# Patient Record
Sex: FEMALE | Race: WHITE | NOT HISPANIC OR LATINO
[De-identification: names, ages, dates, MRNs, and addresses within clinical notes are randomized per-mention and may not be internally consistent; named-entity substitution may affect disease eponyms.]

---

## 2021-12-19 ENCOUNTER — TRANSCRIPTION ENCOUNTER (OUTPATIENT)
Age: 61
End: 2021-12-19

## 2023-01-30 ENCOUNTER — APPOINTMENT (OUTPATIENT)
Dept: ORTHOPEDIC SURGERY | Facility: CLINIC | Age: 63
End: 2023-01-30
Payer: COMMERCIAL

## 2023-01-30 VITALS — WEIGHT: 195 LBS | BODY MASS INDEX: 35.88 KG/M2 | HEIGHT: 62 IN

## 2023-01-30 DIAGNOSIS — J45.909 UNSPECIFIED ASTHMA, UNCOMPLICATED: ICD-10-CM

## 2023-01-30 DIAGNOSIS — E11.9 TYPE 2 DIABETES MELLITUS W/OUT COMPLICATIONS: ICD-10-CM

## 2023-01-30 DIAGNOSIS — I10 ESSENTIAL (PRIMARY) HYPERTENSION: ICD-10-CM

## 2023-01-30 PROBLEM — Z00.00 ENCOUNTER FOR PREVENTIVE HEALTH EXAMINATION: Status: ACTIVE | Noted: 2023-01-30

## 2023-01-30 PROCEDURE — 99204 OFFICE O/P NEW MOD 45 MIN: CPT

## 2023-01-30 RX ORDER — METHOCARBAMOL 750 MG/1
750 TABLET, FILM COATED ORAL 3 TIMES DAILY
Qty: 90 | Refills: 0 | Status: ACTIVE | COMMUNITY
Start: 2023-01-30 | End: 1900-01-01

## 2023-01-30 RX ORDER — METHYLPREDNISOLONE 4 MG/1
4 TABLET ORAL
Qty: 1 | Refills: 0 | Status: ACTIVE | COMMUNITY
Start: 2023-01-30 | End: 1900-01-01

## 2023-01-30 NOTE — HISTORY OF PRESENT ILLNESS
[Neck] : neck [Mid-back] : mid-back [Lower back] : lower back [9] : 9 [Radiating] : radiating [Shooting] : shooting [Constant] : constant [Household chores] : household chores [Work] : work [Sleep] : sleep [Nothing helps with pain getting better] : Nothing helps with pain getting better [Sitting] : sitting [Standing] : standing [Walking] : walking [Lying in bed] : lying in bed [Full time] : Work status: full time [de-identified] : 01/30/2023 - 64 y/o F presenting for an evaluation of cervical and lumbar spine. Last seen several years ago (hx of l-spine surgery). Newer cervical symptoms. During the studies from 09/22, patients symptoms were not as severe and prevalent. In terms of recent treatments, she is taking Naprosyn on daily basis. No improvement despite home exercise rehab program.  She reports difficulty while sleeping. multiple falls throughout this year, follows up with concussion specialist. Also c/o instability. General medical health is good. She is Diabetic, controlled. \par \par Cervical - Radiating right arm pain. neck and arm pain, progressive over the last couple months and acute flare up this weekend. carpal tunnel b/l severe on the right side (numbness in fingers). weakness in right arm. \par Lumbar- Chronic hx of back issues. radiating b/l leg pain, worse on the right. standing up straight produces radiating back pain. 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

## 2023-01-30 NOTE — PHYSICAL EXAM
[Biceps 2+] : biceps 2+ [Triceps 2+] : triceps 2+ [Brachioradialis 2+] : brachioradialis 2+ [Right] : right shoulder [Normal Coordination] : normal coordination [Normal DTR UE/LE] : normal DTR UE/LE  [Normal Sensation] : normal sensation [Normal Mood and Affect] : normal mood and affect [Orientated] : orientated [Able to Communicate] : able to communicate [Normal Skin] : normal skin [No Rash] : no rash [No Ulcers] : no ulcers [No Lesions] : no lesions [No obvious lymphadenopathy in areas examined] : no obvious lymphadenopathy in areas examined [Well Developed] : well developed [Well Nourished] : well nourished [Peripheral vascular exam is grossly normal] : peripheral vascular exam is grossly normal [No Respiratory Distress] : no respiratory distress [3___] : right triceps 3[unfilled]/5 [Flexion] : flexion [Extension] : extension [de-identified] : Constitutional:\par - General Appearance:\par Unremarkable\par Body Habitus\par Well Developed\par Well Nourished\par Body Habitus\par No Deformities\par Well Groomed\par Ability To communicate:\par Normal\par Neurologic:\par Global sensation is intact to upper and lower extremities. See examination of Neck and/or Spine\par for exceptions.\par Orientation to Time, Place and Person is: Normal\par Mood And Affect is Normal\par Skin:\par - Head/Face, Right Upper/Lower Extremity, Left Upper/Lower Extremity: Normal\par See Examination of Neck and/or Spine for exceptions\par Cardiovascular:\par Peripheral Cardiovascular System is Normal\par Palpation of Lymph Nodes:\par Normal Palpation of lymph nodes in: Axilla, Cervical, Inguinal\par Abnormal Palpation of lymph nodes in: None  [] : negative facet loading [FreeTextEntry9] : rom of right shoulder produces pain in the right arm

## 2023-01-30 NOTE — DISCUSSION/SUMMARY
[de-identified] : In regard to the cervical spine:\par I am requesting an updated cervical spine MRI for chronic cervical radiculopathy, etiology of new right upper extremity weakness( right bicep 3/5, tricep 3/5), r/o HNP. \par I am prescribing the patient MDP for pain relief. Titration schedule provided. Counseled patient to be cognisant of blood sugar levels while completing oral steroid taper.\par She will follow up after the study is complete. \par \par In regard to the lumbar spine:\par MRI shows spondylolisthesis L4/5 with severe stenosis, moderate stenosis L2/3 L3/4. At this time treatment plan will be more focused in terms of cervical spine as this is the majority of patients symptom complex. \par \par Prior to appointment and during encounter with patient extensive medical records were reviewed including but not limited to, hospital records, outpatient records, imaging results, and lab data.During this appointment the patient was examined, diagnoses were discussed and explained in a face to face manner. In addition extensive time was spent reviewing aforementioned diagnostic studies. Counseling including abnormal image results, differential diagnoses, treatment options, risk and benefits, lifestyle changes, current condition, and current medications was performed. Patient's comments, questions, and concerns were addressed and patient verbalized understanding. Based on this patient's presentation at our office, which is an orthopedic spine surgeon's office, this patient inherently / intrinsically has a risk, however minute, of developing issues such as Cauda equina syndrome, bowel and bladder changes, or progression of motor or neurological deficits such as paralysis which may be permanent.  \par \par KENYA TITUS Acting as a Scribe for Dr. Hanna\par I, Kenya Titus, attest that this documentation has been prepared under the direction and in the presence of Provider Ramses Hanna MD.

## 2023-01-30 NOTE — DATA REVIEWED
[Cervical Spine] : cervical spine [MRI] : MRI [Lumbar Spine] : lumbar spine [Report was reviewed and noted in the chart] : The report was reviewed and noted in the chart [I independently reviewed and interpreted images and report] : I independently reviewed and interpreted images and report [I reviewed the films/CD and additionally noted] : I reviewed the films/CD and additionally noted [FreeTextEntry2] : 09/2022 - spondylolisthesis L4/5 with severe stenosis, moderate stenosis L2/3 L3/4 [FreeTextEntry1] : I stop paperwork reviewed

## 2023-02-02 ENCOUNTER — RESULT REVIEW (OUTPATIENT)
Age: 63
End: 2023-02-02

## 2023-02-15 ENCOUNTER — APPOINTMENT (OUTPATIENT)
Dept: ORTHOPEDIC SURGERY | Facility: CLINIC | Age: 63
End: 2023-02-15
Payer: COMMERCIAL

## 2023-02-15 VITALS — BODY MASS INDEX: 35.88 KG/M2 | WEIGHT: 195 LBS | HEIGHT: 62 IN

## 2023-02-15 PROCEDURE — 99214 OFFICE O/P EST MOD 30 MIN: CPT

## 2023-02-15 RX ORDER — GABAPENTIN 300 MG/1
300 CAPSULE ORAL 3 TIMES DAILY
Qty: 90 | Refills: 1 | Status: ACTIVE | COMMUNITY
Start: 2023-02-15 | End: 1900-01-01

## 2023-02-26 NOTE — HISTORY OF PRESENT ILLNESS
[Neck] : neck [Mid-back] : mid-back [Lower back] : lower back [9] : 9 [Radiating] : radiating [Shooting] : shooting [Constant] : constant [Household chores] : household chores [Work] : work [Sleep] : sleep [Nothing helps with pain getting better] : Nothing helps with pain getting better [Sitting] : sitting [Standing] : standing [Walking] : walking [Lying in bed] : lying in bed [Full time] : Work status: full time [de-identified] : 02/15/2023 - 62 y/o F presenting for a test follow up of C spine. No significant change in symptoms since her last visit. \par Cervical - Radiating right arm pain. neck and arm pain. She states her arm pain is constant. Mildly improved symptoms from MDP, but not significant. She is taking Naproxen daily, which provides some relief. Difficulty completing tasks due to pain in the right arm, not weakness. Side effects from Lyrica, but treatment with gabapentin in the past was helpful. \par \par 01/30/2023 - 62 y/o F presenting for an evaluation of cervical and lumbar spine. Last seen several years ago (hx of l-spine surgery). Newer cervical symptoms. During the studies from 09/22, patients symptoms were not as severe and prevalent. In terms of recent treatments, she is taking Naprosyn on daily basis. No improvement despite home exercise rehab program.  She reports difficulty while sleeping. multiple falls throughout this year, follows up with concussion specialist. Also c/o instability. General medical health is good. She is Diabetic, controlled. \par \par Cervical - Radiating right arm pain. neck and arm pain, progressive over the last couple months and acute flare up this weekend. carpal tunnel b/l severe on the right side (numbness in fingers). weakness in right arm. \par Lumbar- Chronic hx of back issues. radiating b/l leg pain, worse on the right. standing up straight produces radiating back pain. 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

## 2023-02-26 NOTE — PHYSICAL EXAM
[Normal Coordination] : normal coordination [Normal DTR UE/LE] : normal DTR UE/LE  [Normal Sensation] : normal sensation [Normal Mood and Affect] : normal mood and affect [Orientated] : orientated [Able to Communicate] : able to communicate [Normal Skin] : normal skin [No Rash] : no rash [No Ulcers] : no ulcers [No Lesions] : no lesions [No obvious lymphadenopathy in areas examined] : no obvious lymphadenopathy in areas examined [Well Developed] : well developed [Well Nourished] : well nourished [Peripheral vascular exam is grossly normal] : peripheral vascular exam is grossly normal [No Respiratory Distress] : no respiratory distress [Extension] : extension [3___] : right triceps 3[unfilled]/5 [Biceps 2+] : biceps 2+ [Triceps 2+] : triceps 2+ [Brachioradialis 2+] : brachioradialis 2+ [de-identified] : Constitutional:\par - General Appearance:\par Unremarkable\par Body Habitus\par Well Developed\par Well Nourished\par Body Habitus\par No Deformities\par Well Groomed\par Ability To communicate:\par Normal\par Neurologic:\par Global sensation is intact to upper and lower extremities. See examination of Neck and/or Spine\par for exceptions.\par Orientation to Time, Place and Person is: Normal\par Mood And Affect is Normal\par Skin:\par - Head/Face, Right Upper/Lower Extremity, Left Upper/Lower Extremity: Normal\par See Examination of Neck and/or Spine for exceptions\par Cardiovascular:\par Peripheral Cardiovascular System is Normal\par Palpation of Lymph Nodes:\par Normal Palpation of lymph nodes in: Axilla, Cervical, Inguinal\par Abnormal Palpation of lymph nodes in: None  [] : negative facet loading

## 2023-02-26 NOTE — DISCUSSION/SUMMARY
[de-identified] : 62 y/o F with mild disc herniations c4-5 c5-6 on the right side, no significant nerve impingement. Not enough nerve compression to be considered a surgical candidate.  Patient will follow up with outside PM to discuss C7-T1 YESIKA for pain relief. Patient was advised to work on stretching, strengthening and range of motion. Continuation of cervical home exercise program. Discussed continued medical mgmt with Naprosyn. She was provided with a prescription for gabapentin. \par \par Prior to appointment and during encounter with patient extensive medical records were reviewed including but not limited to, hospital records, outpatient records, imaging results, and lab data.During this appointment the patient was examined, diagnoses were discussed and explained in a face to face manner. In addition extensive time was spent reviewing aforementioned diagnostic studies. Counseling including abnormal image results, differential diagnoses, treatment options, risk and benefits, lifestyle changes, current condition, and current medications was performed. Patient's comments, questions, and concerns were addressed and patient verbalized understanding. Based on this patient's presentation at our office, which is an orthopedic spine surgeon's office, this patient inherently / intrinsically has a risk, however minute, of developing issues such as Cauda equina syndrome, bowel and bladder changes, or progression of motor or neurological deficits such as paralysis which may be permanent.  \par \par KENYA DUMONT Acting as a Scribe for Kenya Alcazar, attest that this documentation has been prepared under the direction and in the presence of Provider Ramses Hanna MD.

## 2023-02-26 NOTE — DATA REVIEWED
[MRI] : MRI [Cervical Spine] : cervical spine [Report was reviewed and noted in the chart] : The report was reviewed and noted in the chart [I independently reviewed and interpreted images and report] : I independently reviewed and interpreted images and report [FreeTextEntry1] : I stop paperwork reviewed

## 2023-03-11 ENCOUNTER — RESULT REVIEW (OUTPATIENT)
Age: 63
End: 2023-03-11

## 2023-03-22 ENCOUNTER — APPOINTMENT (OUTPATIENT)
Dept: ORTHOPEDIC SURGERY | Facility: CLINIC | Age: 63
End: 2023-03-22
Payer: COMMERCIAL

## 2023-03-22 VITALS — BODY MASS INDEX: 35.88 KG/M2 | HEIGHT: 62 IN | WEIGHT: 195 LBS

## 2023-03-22 PROCEDURE — 99214 OFFICE O/P EST MOD 30 MIN: CPT

## 2023-03-22 RX ORDER — METHOCARBAMOL 750 MG/1
750 TABLET, FILM COATED ORAL 3 TIMES DAILY
Qty: 90 | Refills: 1 | Status: ACTIVE | COMMUNITY
Start: 2023-03-22 | End: 1900-01-01

## 2023-03-27 NOTE — DISCUSSION/SUMMARY
[de-identified] : MRI reviewed with the patient from 06/2020 demonstrates severe stenosis L4/5. Moderate stenosis L2/3 L3/4\par I am requesting an updated L spine MRI to evaluate for stenosis, last study is approx 3 years ago, patient has progressive symptoms and 2 falls, refrac to nsaids, muscle relaxer, nerve medication, PT. I am referring the patient to pain management to discuss trying Lumbar Epidural Spine Injections for pain relief with Dr. Tsang. Possible surgical candidate for decompression based upon MRI review and if symptoms continue to be functionally incapacitating. She will continue current PT trial. Patient was provided with a prescription for methocarbamol. Follow up after MRI for review. \par \par Prior to appointment and during encounter with patient extensive medical records were reviewed including but not limited to, hospital records, outpatient records, imaging results, and lab data.During this appointment the patient was examined, diagnoses were discussed and explained in a face to face manner. In addition extensive time was spent reviewing aforementioned diagnostic studies. Counseling including abnormal image results, differential diagnoses, treatment options, risk and benefits, lifestyle changes, current condition, and current medications was performed. Patient's comments, questions, and concerns were addressed and patient verbalized understanding. Based on this patient's presentation at our office, which is an orthopedic spine surgeon's office, this patient inherently / intrinsically has a risk, however minute, of developing issues such as Cauda equina syndrome, bowel and bladder changes, or progression of motor or neurological deficits such as paralysis which may be permanent.\par \par KENYA DUMONT Acting as a Scribe for Dr. Hanna\par I, Kenya Dumont, attest that this documentation has been prepared under the direction and in the presence of Provider Ramses Hanna MD.

## 2023-03-27 NOTE — PHYSICAL EXAM
[Normal Coordination] : normal coordination [Normal DTR UE/LE] : normal DTR UE/LE  [Normal Sensation] : normal sensation [Normal Mood and Affect] : normal mood and affect [Orientated] : orientated [Able to Communicate] : able to communicate [Normal Skin] : normal skin [No Rash] : no rash [No Ulcers] : no ulcers [No Lesions] : no lesions [No obvious lymphadenopathy in areas examined] : no obvious lymphadenopathy in areas examined [Well Developed] : well developed [Well Nourished] : well nourished [Peripheral vascular exam is grossly normal] : peripheral vascular exam is grossly normal [No Respiratory Distress] : no respiratory distress [] : light touch intact throughout both lower extremities [de-identified] : Constitutional:\par - General Appearance:\par Unremarkable\par Body Habitus\par Well Developed\par Well Nourished\par Body Habitus\par No Deformities\par Well Groomed\par Ability To communicate:\par Normal\par Neurologic:\par Global sensation is intact to upper and lower extremities. See examination of Neck and/or Spine\par for exceptions.\par Orientation to Time, Place and Person is: Normal\par Mood And Affect is Normal\par Skin:\par - Head/Face, Right Upper/Lower Extremity, Left Upper/Lower Extremity: Normal\par See Examination of Neck and/or Spine for exceptions\par Cardiovascular:\par Peripheral Cardiovascular System is Normal\par Palpation of Lymph Nodes:\par Normal Palpation of lymph nodes in: Axilla, Cervical, Inguinal\par Abnormal Palpation of lymph nodes in: None

## 2023-03-27 NOTE — HISTORY OF PRESENT ILLNESS
[Neck] : neck [Mid-back] : mid-back [Lower back] : lower back [9] : 9 [Radiating] : radiating [Shooting] : shooting [Constant] : constant [Household chores] : household chores [Work] : work [Sleep] : sleep [Nothing helps with pain getting better] : Nothing helps with pain getting better [Sitting] : sitting [Standing] : standing [Walking] : walking [Lying in bed] : lying in bed [Full time] : Work status: full time [de-identified] : 3/22/23: The patient is a 63 year female who presents today follow up low back pain. Severity of left sided L spine pain and sciatica b/l worse since last visit. She reports 2 falls since last visit (imbalance due to back pain and sciatic). She is currently following up for post concussion symptoms. Strength in the LE remains intact. No paresthesia. She reports side effects from gabapentin, self discontinued. Occasionally using prescribed muscle relaxer at nighttime. Taking Naproxen for fibromyalgia, pain is breaking through. \par Cervical symptoms are tolerable at this time, pain in the arm controlled. \par Pain:    At Rest: 5/10 \par With Activity:  8/10 \par Improves with: nothing, muscle relaxer\par Worse with: standing, sitting\par Treatment/Imaging/Studies Since: zprad xray\par Change since last visit: fell on 2/28/23, and 3/10/23\par Additional Information: Seeing Dr Cotto for Concussion\par \par 02/15/2023 - 62 y/o F presenting for a test follow up of C spine. No significant change in symptoms since her last visit. \par Cervical - Radiating right arm pain. neck and arm pain. She states her arm pain is constant. Mildly improved symptoms from MDP, but not significant. She is taking Naproxen daily, which provides some relief. Difficulty completing tasks due to pain in the right arm, not weakness. Side effects from Lyrica, but treatment with gabapentin in the past was helpful. \par \par 01/30/2023 - 62 y/o F presenting for an evaluation of cervical and lumbar spine. Last seen several years ago (hx of l-spine surgery). Newer cervical symptoms. During the studies from 09/22, patients symptoms were not as severe and prevalent. In terms of recent treatments, she is taking Naprosyn on daily basis. No improvement despite home exercise rehab program.  She reports difficulty while sleeping. multiple falls throughout this year, follows up with concussion specialist. Also c/o instability. General medical health is good. She is Diabetic, controlled. \par \par Cervical - Radiating right arm pain. neck and arm pain, progressive over the last couple months and acute flare up this weekend. carpal tunnel b/l severe on the right side (numbness in fingers). weakness in right arm. \par Lumbar- Chronic hx of back issues. radiating b/l leg pain, worse on the right. standing up straight produces radiating back pain. 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

## 2023-04-27 ENCOUNTER — RESULT REVIEW (OUTPATIENT)
Age: 63
End: 2023-04-27

## 2023-05-03 ENCOUNTER — APPOINTMENT (OUTPATIENT)
Dept: ORTHOPEDIC SURGERY | Facility: CLINIC | Age: 63
End: 2023-05-03
Payer: COMMERCIAL

## 2023-05-03 PROCEDURE — 99214 OFFICE O/P EST MOD 30 MIN: CPT

## 2023-05-03 NOTE — HISTORY OF PRESENT ILLNESS
[Neck] : neck [Mid-back] : mid-back [Lower back] : lower back [9] : 9 [Radiating] : radiating [Shooting] : shooting [Constant] : constant [Household chores] : household chores [Work] : work [Sleep] : sleep [Nothing helps with pain getting better] : Nothing helps with pain getting better [Sitting] : sitting [Standing] : standing [Walking] : walking [Lying in bed] : lying in bed [Full time] : Work status: full time [de-identified] : 5/3/23: Patient presenting for follow up and MRI review of L Spine. Symptom wise, she complains of significant radicular pains in the b/l legs (R > L), although both are significant. Pain level is interfering with day to day living. difficulty driving, walking, standing, sitting. Ambulating with use of a cane. currently enrolled in PT, aquatic therapy - which seems to be helping but pain is continuing to break through. Scheduled for consult with PM tomorrow. Legs are subjectively weak. No foot drop. \par \par 3/22/23: The patient is a 63 year female who presents today follow up low back pain. Severity of left sided L spine pain and sciatica b/l worse since last visit. She reports 2 falls since last visit (imbalance due to back pain and sciatic). She is currently following up for post concussion symptoms. Strength in the LE remains intact. No paresthesia. She reports side effects from gabapentin, self discontinued. Occasionally using prescribed muscle relaxer at nighttime. Taking Naproxen for fibromyalgia, pain is breaking through. \par Cervical symptoms are tolerable at this time, pain in the arm controlled. \par Pain:    At Rest: 5/10 \par With Activity:  8/10 \par Improves with: nothing, muscle relaxer\par Worse with: standing, sitting\par Treatment/Imaging/Studies Since: zprad xray\par Change since last visit: fell on 2/28/23, and 3/10/23\par Additional Information: Seeing Dr Cotto for Concussion\par \par 02/15/2023 - 64 y/o F presenting for a test follow up of C spine. No significant change in symptoms since her last visit. \par Cervical - Radiating right arm pain. neck and arm pain. She states her arm pain is constant. Mildly improved symptoms from MDP, but not significant. She is taking Naproxen daily, which provides some relief. Difficulty completing tasks due to pain in the right arm, not weakness. Side effects from Lyrica, but treatment with gabapentin in the past was helpful. \par \par 01/30/2023 - 64 y/o F presenting for an evaluation of cervical and lumbar spine. Last seen several years ago (hx of l-spine surgery). Newer cervical symptoms. During the studies from 09/22, patients symptoms were not as severe and prevalent. In terms of recent treatments, she is taking Naprosyn on daily basis. No improvement despite home exercise rehab program.  She reports difficulty while sleeping. multiple falls throughout this year, follows up with concussion specialist. Also c/o instability. General medical health is good. She is Diabetic, controlled. \par \par Cervical - Radiating right arm pain. neck and arm pain, progressive over the last couple months and acute flare up this weekend. carpal tunnel b/l severe on the right side (numbness in fingers). weakness in right arm. \par Lumbar- Chronic hx of back issues. radiating b/l leg pain, worse on the right. standing up straight produces radiating back pain. 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

## 2023-05-03 NOTE — PHYSICAL EXAM
[Normal Coordination] : normal coordination [Normal DTR UE/LE] : normal DTR UE/LE  [Normal Sensation] : normal sensation [Normal Mood and Affect] : normal mood and affect [Orientated] : orientated [Able to Communicate] : able to communicate [Normal Skin] : normal skin [No Rash] : no rash [No Ulcers] : no ulcers [No Lesions] : no lesions [No obvious lymphadenopathy in areas examined] : no obvious lymphadenopathy in areas examined [Well Developed] : well developed [Well Nourished] : well nourished [Peripheral vascular exam is grossly normal] : peripheral vascular exam is grossly normal [No Respiratory Distress] : no respiratory distress [] : ambulation with cane [de-identified] : Constitutional:\par - General Appearance:\par Unremarkable\par Body Habitus\par Well Developed\par Well Nourished\par Body Habitus\par No Deformities\par Well Groomed\par Ability To communicate:\par Normal\par Neurologic:\par Global sensation is intact to upper and lower extremities. See examination of Neck and/or Spine\par for exceptions.\par Orientation to Time, Place and Person is: Normal\par Mood And Affect is Normal\par Skin:\par - Head/Face, Right Upper/Lower Extremity, Left Upper/Lower Extremity: Normal\par See Examination of Neck and/or Spine for exceptions\par Cardiovascular:\par Peripheral Cardiovascular System is Normal\par Palpation of Lymph Nodes:\par Normal Palpation of lymph nodes in: Axilla, Cervical, Inguinal\par Abnormal Palpation of lymph nodes in: None

## 2023-05-03 NOTE — DISCUSSION/SUMMARY
[de-identified] : MRI reviewed with the patient from 4/27/23 demonstrates severe stenosis L4/5. Moderate stenosis L2/3 L3/4. \par Ultimately, I discussed with the patient she is a surgical candidate for decompression if symptoms continue to be functionally incapacitating and refractory to non operative treatments. She will continue current PT trial and aquatic therapy. Patient will keep holding appointment with pain management (Dr. Tsang) scheduled for tomorrow and receive LESI. F/U in 4 wks, will asses response to PT and injection at next visit. \par \par Prior to appointment and during encounter with patient extensive medical records were reviewed including but not limited to, hospital records, outpatient records, imaging results, and lab data.During this appointment the patient was examined, diagnoses were discussed and explained in a face to face manner. In addition extensive time was spent reviewing aforementioned diagnostic studies. Counseling including abnormal image results, differential diagnoses, treatment options, risk and benefits, lifestyle changes, current condition, and current medications was performed. Patient's comments, questions, and concerns were addressed and patient verbalized understanding. Based on this patient's presentation at our office, which is an orthopedic spine surgeon's office, this patient inherently / intrinsically has a risk, however minute, of developing issues such as Cauda equina syndrome, bowel and bladder changes, or progression of motor or neurological deficits such as paralysis which may be permanent.\par \par KENYA TITUS Acting as a Scribe for Dr. Manzo I, Kenya Titus, attest that this documentation has been prepared under the direction and in the presence of Provider Ramses Hanna MD.

## 2023-05-31 ENCOUNTER — APPOINTMENT (OUTPATIENT)
Dept: ORTHOPEDIC SURGERY | Facility: CLINIC | Age: 63
End: 2023-05-31
Payer: COMMERCIAL

## 2023-05-31 VITALS — WEIGHT: 195 LBS | BODY MASS INDEX: 35.88 KG/M2 | HEIGHT: 62 IN

## 2023-05-31 PROCEDURE — 99214 OFFICE O/P EST MOD 30 MIN: CPT

## 2023-05-31 RX ORDER — METHOCARBAMOL 750 MG/1
750 TABLET, FILM COATED ORAL
Qty: 30 | Refills: 1 | Status: ACTIVE | COMMUNITY
Start: 2023-05-31 | End: 1900-01-01

## 2023-06-01 NOTE — HISTORY OF PRESENT ILLNESS
[Neck] : neck [Mid-back] : mid-back [Lower back] : lower back [9] : 9 [Radiating] : radiating [Shooting] : shooting [Constant] : constant [Household chores] : household chores [Work] : work [Sleep] : sleep [Nothing helps with pain getting better] : Nothing helps with pain getting better [Sitting] : sitting [Standing] : standing [Walking] : walking [Lying in bed] : lying in bed [Full time] : Work status: full time [de-identified] : 5/31/2023: Patient presenting for an L spine follow up. She received MBB with Dr. Tsang a few weeks ago - initially there was significant relief but the pain has returned intermittently. B/l LE strength is intact. Overall, b/l LE radic pains are controlled. RLE pain > LLE pain. She is not currently enrolled in PT, but reports she has benefited from it in the past. She is not on aspirin. \par \par 5/3/23: Patient presenting for follow up and MRI review of L Spine. Symptom wise, she complains of significant radicular pains in the b/l legs (R > L), although both are significant. Pain level is interfering with day to day living. difficulty driving, walking, standing, sitting. Ambulating with use of a cane. currently enrolled in PT, aquatic therapy - which seems to be helping but pain is continuing to break through. Scheduled for consult with PM tomorrow. Legs are subjectively weak. No foot drop. \par \par 3/22/23: The patient is a 63 year female who presents today follow up low back pain. Severity of left sided L spine pain and sciatica b/l worse since last visit. She reports 2 falls since last visit (imbalance due to back pain and sciatic). She is currently following up for post concussion symptoms. Strength in the LE remains intact. No paresthesia. She reports side effects from gabapentin, self discontinued. Occasionally using prescribed muscle relaxer at nighttime. Taking Naproxen for fibromyalgia, pain is breaking through. \par Cervical symptoms are tolerable at this time, pain in the arm controlled. \par Pain:    At Rest: 5/10 \par With Activity:  8/10 \par Improves with: nothing, muscle relaxer\par Worse with: standing, sitting\par Treatment/Imaging/Studies Since: zprad xray\par Change since last visit: fell on 2/28/23, and 3/10/23\par Additional Information: Seeing Dr Cotto for Concussion\par \par 02/15/2023 - 62 y/o F presenting for a test follow up of C spine. No significant change in symptoms since her last visit. \par Cervical - Radiating right arm pain. neck and arm pain. She states her arm pain is constant. Mildly improved symptoms from MDP, but not significant. She is taking Naproxen daily, which provides some relief. Difficulty completing tasks due to pain in the right arm, not weakness. Side effects from Lyrica, but treatment with gabapentin in the past was helpful. \par \par 01/30/2023 - 62 y/o F presenting for an evaluation of cervical and lumbar spine. Last seen several years ago (hx of l-spine surgery). Newer cervical symptoms. During the studies from 09/22, patients symptoms were not as severe and prevalent. In terms of recent treatments, she is taking Naprosyn on daily basis. No improvement despite home exercise rehab program.  She reports difficulty while sleeping. multiple falls throughout this year, follows up with concussion specialist. Also c/o instability. General medical health is good. She is Diabetic, controlled. \par \par Cervical - Radiating right arm pain. neck and arm pain, progressive over the last couple months and acute flare up this weekend. carpal tunnel b/l severe on the right side (numbness in fingers). weakness in right arm. \par Lumbar- Chronic hx of back issues. radiating b/l leg pain, worse on the right. standing up straight produces radiating back pain. 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

## 2023-06-01 NOTE — DATA REVIEWED
[MRI] : MRI [Lumbar Spine] : lumbar spine [Report was reviewed and noted in the chart] : The report was reviewed and noted in the chart [I independently reviewed and interpreted images and report] : I independently reviewed and interpreted images and report [I reviewed the films/CD and additionally noted] : I reviewed the films/CD and additionally noted [FreeTextEntry1] : I stop paperwork reviewed\par PAin mgmt progress notes reviewed

## 2023-06-01 NOTE — DISCUSSION/SUMMARY
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 Moderate stenosis L2/3 L3/4. \par Ultimately, I discussed with the patient she is a surgical candidate for decompression if symptoms are functionally incapacitating and refractory to non operative treatments. However, she seems to be trending in a less symptomatic direction with conservative treatments. She will resume her formal PT trial and aquatic therapy - referral provided. Patient will continue to follow up with pain management (Dr. Tsang) and continue to asses response to MBB, possible LESI in the future if symptoms do not improve. Continue muscle relaxer as qhs. F/U 4 WKS. \par \par Prior to appointment and during encounter with patient extensive medical records were reviewed including but not limited to, hospital records, outpatient records, imaging results, and lab data.During this appointment the patient was examined, diagnoses were discussed and explained in a face to face manner. In addition extensive time was spent reviewing aforementioned diagnostic studies. Counseling including abnormal image results, differential diagnoses, treatment options, risk and benefits, lifestyle changes, current condition, and current medications was performed. Patient's comments, questions, and concerns were addressed and patient verbalized understanding. Based on this patient's presentation at our office, which is an orthopedic spine surgeon's office, this patient inherently / intrinsically has a risk, however minute, of developing issues such as Cauda equina syndrome, bowel and bladder changes, or progression of motor or neurological deficits such as paralysis which may be permanent.\par \par KENYA DUMONT Acting as a Scribe for Dr. Hanna\anamika I, Kenya Dumont, attest that this documentation has been prepared under the direction and in the presence of Provider Ramses Hanna MD.

## 2023-06-01 NOTE — PHYSICAL EXAM
[Normal Coordination] : normal coordination [Normal DTR UE/LE] : normal DTR UE/LE  [Normal Sensation] : normal sensation [Normal Mood and Affect] : normal mood and affect [Orientated] : orientated [Able to Communicate] : able to communicate [Normal Skin] : normal skin [No Rash] : no rash [No Ulcers] : no ulcers [No Lesions] : no lesions [No obvious lymphadenopathy in areas examined] : no obvious lymphadenopathy in areas examined [Well Developed] : well developed [Well Nourished] : well nourished [Peripheral vascular exam is grossly normal] : peripheral vascular exam is grossly normal [No Respiratory Distress] : no respiratory distress [de-identified] : Constitutional:\par - General Appearance:\par Unremarkable\par Body Habitus\par Well Developed\par Well Nourished\par Body Habitus\par No Deformities\par Well Groomed\par Ability To communicate:\par Normal\par Neurologic:\par Global sensation is intact to upper and lower extremities. See examination of Neck and/or Spine\par for exceptions.\par Orientation to Time, Place and Person is: Normal\par Mood And Affect is Normal\par Skin:\par - Head/Face, Right Upper/Lower Extremity, Left Upper/Lower Extremity: Normal\par See Examination of Neck and/or Spine for exceptions\par Cardiovascular:\par Peripheral Cardiovascular System is Normal\par Palpation of Lymph Nodes:\par Normal Palpation of lymph nodes in: Axilla, Cervical, Inguinal\par Abnormal Palpation of lymph nodes in: None  [] : negative straight leg raise

## 2023-06-28 ENCOUNTER — APPOINTMENT (OUTPATIENT)
Dept: ORTHOPEDIC SURGERY | Facility: CLINIC | Age: 63
End: 2023-06-28
Payer: COMMERCIAL

## 2023-06-28 VITALS — HEIGHT: 62 IN | WEIGHT: 195 LBS | BODY MASS INDEX: 35.88 KG/M2

## 2023-06-28 PROCEDURE — 99214 OFFICE O/P EST MOD 30 MIN: CPT

## 2023-06-28 RX ORDER — METHOCARBAMOL 750 MG/1
750 TABLET, FILM COATED ORAL TWICE DAILY
Qty: 60 | Refills: 1 | Status: ACTIVE | COMMUNITY
Start: 2023-06-28 | End: 1900-01-01

## 2023-06-28 RX ORDER — TRAMADOL HYDROCHLORIDE 50 MG/1
50 TABLET, COATED ORAL TWICE DAILY
Qty: 60 | Refills: 0 | Status: ACTIVE | COMMUNITY
Start: 2023-06-28 | End: 1900-01-01

## 2023-06-29 NOTE — DATA REVIEWED
[Cervical Spine] : cervical spine [MRI] : MRI [Lumbar Spine] : lumbar spine [Report was reviewed and noted in the chart] : The report was reviewed and noted in the chart [I independently reviewed and interpreted images and report] : I independently reviewed and interpreted images and report [I reviewed the films/CD and additionally noted] : I reviewed the films/CD and additionally noted [FreeTextEntry2] : Grade 1 spondy L4-5 with severe stenosis. Mod/severe stenosis L2-3,3-4 [FreeTextEntry1] : I stop paperwork reviewed

## 2023-06-29 NOTE — DISCUSSION/SUMMARY
[Medication Risks Reviewed] : Medication risks reviewed [de-identified] : 64 y/o F with severe stenosis L4/5. Moderate stenosis L2/3 L3/4. \par Ultimately, I discussed with the patient she is a surgical candidate for lami/fusion if symptoms continue to be functionally incapacitating and refractory to non operative treatments. She will continue PT trial and aquatic therapy. Patient will continue to follow up with pain management (Dr. Tsang) for treatment options - she is scheduled for LESI in August. Continue muscle relaxer as qhs. Prescriptions provided for tramadol and Robaxin.  \par \par Cervical:\par MRI CERVICAL 02/2023 - rt neuroforaminal narrowing C4/5 C5/6. \par I am referring the patient to pain management to discuss trying WALKER for RUE pain relief. She will follow up with Dr. Tsang. \par \par F/U August 2023. \par \par Prior to appointment and during encounter with patient extensive medical records were reviewed including but not limited to, hospital records, outpatient records, imaging results, and lab data.During this appointment the patient was examined, diagnoses were discussed and explained in a face to face manner. In addition extensive time was spent reviewing aforementioned diagnostic studies. Counseling including abnormal image results, differential diagnoses, treatment options, risk and benefits, lifestyle changes, current condition, and current medications was performed. Patient's comments, questions, and concerns were addressed and patient verbalized understanding. Based on this patient's presentation at our office, which is an orthopedic spine surgeon's office, this patient inherently / intrinsically has a risk, however minute, of developing issues such as Cauda equina syndrome, bowel and bladder changes, or progression of motor or neurological deficits such as paralysis which may be permanent.\par \par KENYA TITUS Acting as a Scribe for Dr. Manzo I, Kenya Titus, attest that this documentation has been prepared under the direction and in the presence of Provider Ramses Hanna MD.

## 2023-06-29 NOTE — HISTORY OF PRESENT ILLNESS
[Neck] : neck [Mid-back] : mid-back [Lower back] : lower back [9] : 9 [Radiating] : radiating [Shooting] : shooting [Constant] : constant [Household chores] : household chores [Work] : work [Sleep] : sleep [Nothing helps with pain getting better] : Nothing helps with pain getting better [Sitting] : sitting [Standing] : standing [Walking] : walking [Lying in bed] : lying in bed [Full time] : Work status: full time [de-identified] : 06/28/2023 - Patient presenting for a follow up. C/o low  back pain and b/l radicular LE pains (R>L). Pain in RT shoulder blade radiating into the RT arm. Taking muscle relaxer at nighttime for symptom control. Short term relief after cortisone injection with Dr. Tsang, pain is becoming unbearable. Difficulty driving due to RT leg cramping. Completing PT and aquatic therapy. D/c gabapentin due to side effects. Taking Naprosyn for fibromyalgia. \par \par 5/31/2023: Patient presenting for an L spine follow up. She received MBB with Dr. Tsang a few weeks ago - initially there was significant relief but the pain has returned intermittently. B/l LE strength is intact. Overall, b/l LE radic pains are controlled. RLE pain > LLE pain. She is not currently enrolled in PT, but reports she has benefited from it in the past. She is not on aspirin. \par \par 5/3/23: Patient presenting for follow up and MRI review of L Spine. Symptom wise, she complains of significant radicular pains in the b/l legs (R > L), although both are significant. Pain level is interfering with day to day living. difficulty driving, walking, standing, sitting. Ambulating with use of a cane. currently enrolled in PT, aquatic therapy - which seems to be helping but pain is continuing to break through. Scheduled for consult with PM tomorrow. Legs are subjectively weak. No foot drop. \par \par 3/22/23: The patient is a 63 year female who presents today follow up low back pain. Severity of left sided L spine pain and sciatica b/l worse since last visit. She reports 2 falls since last visit (imbalance due to back pain and sciatic). She is currently following up for post concussion symptoms. Strength in the LE remains intact. No paresthesia. She reports side effects from gabapentin, self discontinued. Occasionally using prescribed muscle relaxer at nighttime. Taking Naproxen for fibromyalgia, pain is breaking through. \par Cervical symptoms are tolerable at this time, pain in the arm controlled. \par Pain:    At Rest: 5/10 \par With Activity:  8/10 \par Improves with: nothing, muscle relaxer\par Worse with: standing, sitting\par Treatment/Imaging/Studies Since: zprad xray\par Change since last visit: fell on 2/28/23, and 3/10/23\par Additional Information: Seeing Dr Cotto for Concussion\par \par 02/15/2023 - 62 y/o F presenting for a test follow up of C spine. No significant change in symptoms since her last visit. \par Cervical - Radiating right arm pain. neck and arm pain. She states her arm pain is constant. Mildly improved symptoms from MDP, but not significant. She is taking Naproxen daily, which provides some relief. Difficulty completing tasks due to pain in the right arm, not weakness. Side effects from Lyrica, but treatment with gabapentin in the past was helpful. \par \par 01/30/2023 - 62 y/o F presenting for an evaluation of cervical and lumbar spine. Last seen several years ago (hx of l-spine surgery). Newer cervical symptoms. During the studies from 09/22, patients symptoms were not as severe and prevalent. In terms of recent treatments, she is taking Naprosyn on daily basis. No improvement despite home exercise rehab program.  She reports difficulty while sleeping. multiple falls throughout this year, follows up with concussion specialist. Also c/o instability. General medical health is good. She is Diabetic, controlled. \par \par Cervical - Radiating right arm pain. neck and arm pain, progressive over the last couple months and acute flare up this weekend. carpal tunnel b/l severe on the right side (numbness in fingers). weakness in right arm. \par Lumbar- Chronic hx of back issues. radiating b/l leg pain, worse on the right. standing up straight produces radiating back pain. 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

## 2023-06-29 NOTE — PHYSICAL EXAM
[Normal Coordination] : normal coordination [Normal DTR UE/LE] : normal DTR UE/LE  [Normal Sensation] : normal sensation [Normal Mood and Affect] : normal mood and affect [Orientated] : orientated [Able to Communicate] : able to communicate [Normal Skin] : normal skin [No Rash] : no rash [No Ulcers] : no ulcers [No Lesions] : no lesions [No obvious lymphadenopathy in areas examined] : no obvious lymphadenopathy in areas examined [Well Developed] : well developed [Well Nourished] : well nourished [Peripheral vascular exam is grossly normal] : peripheral vascular exam is grossly normal [No Respiratory Distress] : no respiratory distress [Right] : right shoulder [de-identified] : Constitutional:\par - General Appearance:\par Unremarkable\par Body Habitus\par Well Developed\par Well Nourished\par Body Habitus\par No Deformities\par Well Groomed\par Ability To communicate:\par Normal\par Neurologic:\par Global sensation is intact to upper and lower extremities. See examination of Neck and/or Spine\par for exceptions.\par Orientation to Time, Place and Person is: Normal\par Mood And Affect is Normal\par Skin:\par - Head/Face, Right Upper/Lower Extremity, Left Upper/Lower Extremity: Normal\par See Examination of Neck and/or Spine for exceptions\par Cardiovascular:\par Peripheral Cardiovascular System is Normal\par Palpation of Lymph Nodes:\par Normal Palpation of lymph nodes in: Axilla, Cervical, Inguinal\par Abnormal Palpation of lymph nodes in: None  [] : negative straight leg raise

## 2023-08-16 ENCOUNTER — APPOINTMENT (OUTPATIENT)
Dept: ORTHOPEDIC SURGERY | Facility: CLINIC | Age: 63
End: 2023-08-16

## 2023-11-17 ENCOUNTER — APPOINTMENT (OUTPATIENT)
Dept: ORTHOPEDIC SURGERY | Facility: CLINIC | Age: 63
End: 2023-11-17
Payer: COMMERCIAL

## 2023-11-17 VITALS — BODY MASS INDEX: 35.88 KG/M2 | HEIGHT: 62 IN | WEIGHT: 195 LBS

## 2023-11-17 PROCEDURE — ZZZZZ: CPT

## 2023-11-17 RX ORDER — METHOCARBAMOL 750 MG/1
750 TABLET, FILM COATED ORAL 4 TIMES DAILY
Qty: 120 | Refills: 1 | Status: ACTIVE | COMMUNITY
Start: 2023-11-17 | End: 1900-01-01

## 2024-01-19 ENCOUNTER — APPOINTMENT (OUTPATIENT)
Dept: ORTHOPEDIC SURGERY | Facility: CLINIC | Age: 64
End: 2024-01-19
Payer: COMMERCIAL

## 2024-01-19 VITALS — WEIGHT: 195 LBS | HEIGHT: 62 IN | BODY MASS INDEX: 35.88 KG/M2

## 2024-01-19 DIAGNOSIS — Z78.9 OTHER SPECIFIED HEALTH STATUS: ICD-10-CM

## 2024-01-19 PROCEDURE — ZZZZZ: CPT

## 2024-01-21 NOTE — PHYSICAL EXAM
[Normal Coordination] : normal coordination [Normal DTR UE/LE] : normal DTR UE/LE  [Normal Sensation] : normal sensation [Normal Mood and Affect] : normal mood and affect [Orientated] : orientated [Able to Communicate] : able to communicate [Normal Skin] : normal skin [No Rash] : no rash [No Ulcers] : no ulcers [No Lesions] : no lesions [No obvious lymphadenopathy in areas examined] : no obvious lymphadenopathy in areas examined [Well Developed] : well developed [Well Nourished] : well nourished [Peripheral vascular exam is grossly normal] : peripheral vascular exam is grossly normal [No Respiratory Distress] : no respiratory distress [Right] : right shoulder [de-identified] : Constitutional:\par  - General Appearance:\par  Unremarkable\par  Body Habitus\par  Well Developed\par  Well Nourished\par  Body Habitus\par  No Deformities\par  Well Groomed\par  Ability To communicate:\par  Normal\par  Neurologic:\par  Global sensation is intact to upper and lower extremities. See examination of Neck and/or Spine\par  for exceptions.\par  Orientation to Time, Place and Person is: Normal\par  Mood And Affect is Normal\par  Skin:\par  - Head/Face, Right Upper/Lower Extremity, Left Upper/Lower Extremity: Normal\par  See Examination of Neck and/or Spine for exceptions\par  Cardiovascular:\par  Peripheral Cardiovascular System is Normal\par  Palpation of Lymph Nodes:\par  Normal Palpation of lymph nodes in: Axilla, Cervical, Inguinal\par  Abnormal Palpation of lymph nodes in: None  [] : negative straight leg raise

## 2024-01-21 NOTE — DISCUSSION/SUMMARY
[Medication Risks Reviewed] : Medication risks reviewed [de-identified] : 64 y/o F with severe stenosis L4/5. Moderate stenosis L2/3 L3/4.  Ultimately, I discussed with the patient she is a surgical candidate for lami/fusion if symptoms continue to be functionally incapacitating and refractory to non operative treatments. She will continue PT trial and aquatic therapy. Patient will continue to follow up with pain management (Dr. Tsang) for treatment options. She will try oxycodone for her night symptoms. We had a lengthy discussion regarding medication and she is well-informed regarding the risks. Follow up in 5-6 weeks.   Cervical: MRI CERVICAL 02/2023 - rt neuroforaminal narrowing C4/5 C5/6.  Discussed trying WALKER for RUE pain relief. She will follow up with Dr. Tsang or Dr. Aragon.   Prior to appointment and during encounter with patient extensive medical records were reviewed including but not limited to, hospital records, outpatient records, imaging results, and lab data. During this appointment the patient was examined, diagnoses were discussed and explained in a face to face manner. In addition extensive time was spent reviewing aforementioned diagnostic studies. Counseling including abnormal image results, differential diagnoses, treatment options, risk and benefits, lifestyle changes, current condition, and current medications was performed. Patient's comments, questions, and concerns were addressed and patient verbalized understanding. Based on this patient's presentation at our office, which is an orthopedic spine surgeon's office, this patient inherently / intrinsically has a risk, however minute, of developing issues such as Cauda equina syndrome, bowel and bladder changes, or progression of motor or neurological deficits such as paralysis which may be permanent.  KALIA JOSUE Acting as a Scribe for Kalia See, attest that this documentation has been prepared under the direction and in the presence of Provider Ramses Hanna MD.

## 2024-01-21 NOTE — HISTORY OF PRESENT ILLNESS
[Neck] : neck [Mid-back] : mid-back [Lower back] : lower back [9] : 9 [Radiating] : radiating [Shooting] : shooting [Constant] : constant [Household chores] : household chores [Work] : work [Sleep] : sleep [Nothing helps with pain getting better] : Nothing helps with pain getting better [Sitting] : sitting [Standing] : standing [Walking] : walking [Lying in bed] : lying in bed [Full time] : Work status: full time [de-identified] : 01/19/2024 - Pt presenting for follow up. She had an epidural on 12/13/23. She reports only temporary relief. Prior to the injection she feels PT had been helping. However, since the epidural she feels that PT is making her symptoms worse. She is proceeding with carpal tunnel release on 3/18/24.   11/17/2023 - Pt presenting in follow up. She reports heightened back and leg pain over the last month. She continues treatment with PT and aquatherapy which is significantly helpful but she states it does not resolve her pain. She also reports instability and falls. States she is sometimes unable to feel the floor beneath her. She d/c Naprosyn due to high BP and is now taking Gabapentin. She is cosidering carpal tunnel release.   06/28/2023 - Patient presenting for a follow up. C/o low  back pain and b/l radicular LE pains (R>L). Pain in RT shoulder blade radiating into the RT arm. Taking muscle relaxer at nighttime for symptom control. Short term relief after cortisone injection with Dr. Tsang, pain is becoming unbearable. Difficulty driving due to RT leg cramping. Completing PT and aquatic therapy. D/c gabapentin due to side effects. Taking Naprosyn for fibromyalgia.   5/31/2023: Patient presenting for an L spine follow up. She received MBB with Dr. Tsang a few weeks ago - initially there was significant relief but the pain has returned intermittently. B/l LE strength is intact. Overall, b/l LE radic pains are controlled. RLE pain > LLE pain. She is not currently enrolled in PT, but reports she has benefited from it in the past. She is not on aspirin.   5/3/23: Patient presenting for follow up and MRI review of L Spine. Symptom wise, she complains of significant radicular pains in the b/l legs (R > L), although both are significant. Pain level is interfering with day to day living. difficulty driving, walking, standing, sitting. Ambulating with use of a cane. currently enrolled in PT, aquatic therapy - which seems to be helping but pain is continuing to break through. Scheduled for consult with PM tomorrow. Legs are subjectively weak. No foot drop.   3/22/23: The patient is a 63 year female who presents today follow up low back pain. Severity of left sided L spine pain and sciatica b/l worse since last visit. She reports 2 falls since last visit (imbalance due to back pain and sciatic). She is currently following up for post concussion symptoms. Strength in the LE remains intact. No paresthesia. She reports side effects from gabapentin, self discontinued. Occasionally using prescribed muscle relaxer at nighttime. Taking Naproxen for fibromyalgia, pain is breaking through.  Cervical symptoms are tolerable at this time, pain in the arm controlled.  Pain:    At Rest: 5/10  With Activity:  8/10  Improves with: nothing, muscle relaxer Worse with: standing, sitting Treatment/Imaging/Studies Since: zprad xray Change since last visit: fell on 2/28/23, and 3/10/23 Additional Information: Seeing Dr Cotto for Concussion  02/15/2023 - 62 y/o F presenting for a test follow up of C spine. No significant change in symptoms since her last visit.  Cervical - Radiating right arm pain. neck and arm pain. She states her arm pain is constant. Mildly improved symptoms from MDP, but not significant. She is taking Naproxen daily, which provides some relief. Difficulty completing tasks due to pain in the right arm, not weakness. Side effects from Lyrica, but treatment with gabapentin in the past was helpful.   01/30/2023 - 62 y/o F presenting for an evaluation of cervical and lumbar spine. Last seen several years ago (hx of l-spine surgery). Newer cervical symptoms. During the studies from 09/22, patients symptoms were not as severe and prevalent. In terms of recent treatments, she is taking Naprosyn on daily basis. No improvement despite home exercise rehab program.  She reports difficulty while sleeping. multiple falls throughout this year, follows up with concussion specialist. Also c/o instability. General medical health is good. She is Diabetic, controlled.   Cervical - Radiating right arm pain. neck and arm pain, progressive over the last couple months and acute flare up this weekend. carpal tunnel b/l severe on the right side (numbness in fingers). weakness in right arm.  Lumbar- Chronic hx of back issues. radiating b/l leg pain, worse on the right. standing up straight produces radiating back pain.     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

## 2024-02-23 RX ORDER — OXYCODONE 5 MG/1
5 TABLET ORAL
Qty: 30 | Refills: 0 | Status: ACTIVE | COMMUNITY
Start: 2024-01-19 | End: 1900-01-01

## 2024-03-10 ENCOUNTER — RX RENEWAL (OUTPATIENT)
Age: 64
End: 2024-03-10

## 2024-05-01 ENCOUNTER — APPOINTMENT (OUTPATIENT)
Dept: ORTHOPEDIC SURGERY | Facility: CLINIC | Age: 64
End: 2024-05-01
Payer: COMMERCIAL

## 2024-05-01 DIAGNOSIS — M54.12 RADICULOPATHY, CERVICAL REGION: ICD-10-CM

## 2024-05-01 DIAGNOSIS — M43.16 SPONDYLOLISTHESIS, LUMBAR REGION: ICD-10-CM

## 2024-05-01 DIAGNOSIS — M48.062 SPINAL STENOSIS, LUMBAR REGION WITH NEUROGENIC CLAUDICATION: ICD-10-CM

## 2024-05-01 PROCEDURE — ZZZZZ: CPT

## 2024-05-01 RX ORDER — METHOCARBAMOL 750 MG/1
750 TABLET, FILM COATED ORAL TWICE DAILY
Qty: 60 | Refills: 1 | Status: ACTIVE | COMMUNITY
Start: 2024-05-01 | End: 1900-01-01

## 2024-05-02 NOTE — PHYSICAL EXAM
[Normal Coordination] : normal coordination [Normal DTR UE/LE] : normal DTR UE/LE  [Normal Sensation] : normal sensation [Normal Mood and Affect] : normal mood and affect [Oriented] : oriented [Able to Communicate] : able to communicate [Normal Skin] : normal skin [No Rash] : no rash [No Ulcers] : no ulcers [No Lesions] : no lesions [No obvious lymphadenopathy in areas examined] : no obvious lymphadenopathy in areas examined [Well Developed] : well developed [Well Nourished] : well nourished [Peripheral vascular exam is grossly normal] : peripheral vascular exam is grossly normal [No Respiratory Distress] : no respiratory distress [5___] : right extensor hallicus longus 5[unfilled]/5 [de-identified] : Constitutional:\par  - General Appearance:\par  Unremarkable\par  Body Habitus\par  Well Developed\par  Well Nourished\par  Body Habitus\par  No Deformities\par  Well Groomed\par  Ability To communicate:\par  Normal\par  Neurologic:\par  Global sensation is intact to upper and lower extremities. See examination of Neck and/or Spine\par  for exceptions.\par  Orientation to Time, Place and Person is: Normal\par  Mood And Affect is Normal\par  Skin:\par  - Head/Face, Right Upper/Lower Extremity, Left Upper/Lower Extremity: Normal\par  See Examination of Neck and/or Spine for exceptions\par  Cardiovascular:\par  Peripheral Cardiovascular System is Normal\par  Palpation of Lymph Nodes:\par  Normal Palpation of lymph nodes in: Axilla, Cervical, Inguinal\par  Abnormal Palpation of lymph nodes in: None  [] : negative straight leg raise [de-identified] : left knee extensor 4/5

## 2024-05-02 NOTE — DISCUSSION/SUMMARY
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 Moderate stenosis L2/3 L3/4.  Ultimately, I discussed with the patient she is a surgical candidate for lami/fusion if symptoms continue to be functionally incapacitating and refractory to non operative treatments. Patient states she is not ready to move forward with spine surgery at this time. She will continue PT trial and aquatic therapy as this seems to be providing relief in symptoms (renewed). Patient will continue to follow up with pain management (Dr. Tsang) for treatment options, referral provided today for LESI. Renewed methocarbamol. Follow up in 5-6 weeks.   Cervical: MRI CERVICAL 02/2023 - rt neuroforaminal narrowing C4/5 C5/6.  Continuation of cervical home exercises.   Prior to appointment and during encounter with patient extensive medical records were reviewed including but not limited to, hospital records, outpatient records, imaging results, and lab data. During this appointment the patient was examined, diagnoses were discussed and explained in a face to face manner. In addition extensive time was spent reviewing aforementioned diagnostic studies. Counseling including abnormal image results, differential diagnoses, treatment options, risk and benefits, lifestyle changes, current condition, and current medications was performed. Patient's comments, questions, and concerns were addressed and patient verbalized understanding. Based on this patient's presentation at our office, which is an orthopedic spine surgeon's office, this patient inherently / intrinsically has a risk, however minute, of developing issues such as Cauda equina syndrome, bowel and bladder changes, or progression of motor or neurological deficits such as paralysis which may be permanent.  KENYA TITUS Acting as a Scribe for Dr. Jacques SHETTY, Kenya Titus, attest that this documentation has been prepared under the direction and in the presence of Provider Ramses Hanna MD.

## 2024-05-02 NOTE — HISTORY OF PRESENT ILLNESS
[de-identified] : 05/01/2024 - Patient presenting in follow up of neck and low back pain. PT for her lumbar spine has been helpful, had to temporarily d/c and feels additional PT / aquatic therapy would be helpful for improvement in symptoms. She underwent carpal tunnel release few weeks ago and states the numbness is starting to improve. She states she stills feel unstable, but PT is helpful for this. Last LESI was several months ago, feels that another injection would be beneficial. Taking muscle relaxer at times at nighttime with relief.   01/19/2024 - Pt presenting for follow up. She had an epidural on 12/13/23. She reports only temporary relief. Prior to the injection she feels PT had been helping. However, since the epidural she feels that PT is making her symptoms worse. She is proceeding with carpal tunnel release on 3/18/24.   11/17/2023 - Pt presenting in follow up. She reports heightened back and leg pain over the last month. She continues treatment with PT and aquatherapy which is significantly helpful but she states it does not resolve her pain. She also reports instability and falls. States she is sometimes unable to feel the floor beneath her. She d/c Naprosyn due to high BP and is now taking Gabapentin. She is cosidering carpal tunnel release.   06/28/2023 - Patient presenting for a follow up. C/o low  back pain and b/l radicular LE pains (R>L). Pain in RT shoulder blade radiating into the RT arm. Taking muscle relaxer at nighttime for symptom control. Short term relief after cortisone injection with Dr. Tsang, pain is becoming unbearable. Difficulty driving due to RT leg cramping. Completing PT and aquatic therapy. D/c gabapentin due to side effects. Taking Naprosyn for fibromyalgia.   5/31/2023: Patient presenting for an L spine follow up. She received MBB with Dr. Tsang a few weeks ago - initially there was significant relief but the pain has returned intermittently. B/l LE strength is intact. Overall, b/l LE radic pains are controlled. RLE pain > LLE pain. She is not currently enrolled in PT, but reports she has benefited from it in the past. She is not on aspirin.   5/3/23: Patient presenting for follow up and MRI review of L Spine. Symptom wise, she complains of significant radicular pains in the b/l legs (R > L), although both are significant. Pain level is interfering with day to day living. difficulty driving, walking, standing, sitting. Ambulating with use of a cane. currently enrolled in PT, aquatic therapy - which seems to be helping but pain is continuing to break through. Scheduled for consult with PM tomorrow. Legs are subjectively weak. No foot drop.   3/22/23: The patient is a 63 year female who presents today follow up low back pain. Severity of left sided L spine pain and sciatica b/l worse since last visit. She reports 2 falls since last visit (imbalance due to back pain and sciatic). She is currently following up for post concussion symptoms. Strength in the LE remains intact. No paresthesia. She reports side effects from gabapentin, self discontinued. Occasionally using prescribed muscle relaxer at nighttime. Taking Naproxen for fibromyalgia, pain is breaking through.  Cervical symptoms are tolerable at this time, pain in the arm controlled.  Pain:    At Rest: 5/10  With Activity:  8/10  Improves with: nothing, muscle relaxer Worse with: standing, sitting Treatment/Imaging/Studies Since: zprad xray Change since last visit: fell on 2/28/23, and 3/10/23 Additional Information: Seeing Dr Cotto for Concussion  02/15/2023 - 64 y/o F presenting for a test follow up of C spine. No significant change in symptoms since her last visit.  Cervical - Radiating right arm pain. neck and arm pain. She states her arm pain is constant. Mildly improved symptoms from MDP, but not significant. She is taking Naproxen daily, which provides some relief. Difficulty completing tasks due to pain in the right arm, not weakness. Side effects from Lyrica, but treatment with gabapentin in the past was helpful.   01/30/2023 - 64 y/o F presenting for an evaluation of cervical and lumbar spine. Last seen several years ago (hx of l-spine surgery). Newer cervical symptoms. During the studies from 09/22, patients symptoms were not as severe and prevalent. In terms of recent treatments, she is taking Naprosyn on daily basis. No improvement despite home exercise rehab program.  She reports difficulty while sleeping. multiple falls throughout this year, follows up with concussion specialist. Also c/o instability. General medical health is good. She is Diabetic, controlled.   Cervical - Radiating right arm pain. neck and arm pain, progressive over the last couple months and acute flare up this weekend. carpal tunnel b/l severe on the right side (numbness in fingers). weakness in right arm.  Lumbar- Chronic hx of back issues. radiating b/l leg pain, worse on the right. standing up straight produces radiating back pain.

## 2024-05-02 NOTE — DATA REVIEWED
[Cervical Spine] : cervical spine [MRI] : MRI [Lumbar Spine] : lumbar spine [Report was reviewed and noted in the chart] : The report was reviewed and noted in the chart [I independently reviewed and interpreted images and report] : I independently reviewed and interpreted images and report [I reviewed the films/CD and additionally noted] : I reviewed the films/CD and additionally noted [FreeTextEntry2] : Grade 1 spondy L4-5 with severe stenosis. Mod/severe stenosis L2-3,3-4 [FreeTextEntry1] : I stop paperwork reviewed PT progress notes reviewed

## 2024-06-19 ENCOUNTER — TRANSCRIPTION ENCOUNTER (OUTPATIENT)
Age: 64
End: 2024-06-19

## 2024-07-10 ENCOUNTER — APPOINTMENT (OUTPATIENT)
Dept: ORTHOPEDIC SURGERY | Facility: CLINIC | Age: 64
End: 2024-07-10

## 2024-07-10 VITALS — WEIGHT: 195 LBS | BODY MASS INDEX: 35.88 KG/M2 | HEIGHT: 62 IN

## 2024-07-10 DIAGNOSIS — M48.062 SPINAL STENOSIS, LUMBAR REGION WITH NEUROGENIC CLAUDICATION: ICD-10-CM

## 2024-07-10 DIAGNOSIS — S39.012A STRAIN OF MUSCLE, FASCIA AND TENDON OF LOWER BACK, INITIAL ENCOUNTER: ICD-10-CM

## 2024-07-10 DIAGNOSIS — M43.16 SPONDYLOLISTHESIS, LUMBAR REGION: ICD-10-CM

## 2024-07-10 PROCEDURE — ZZZZZ: CPT

## 2024-07-10 RX ORDER — OXYCODONE 5 MG/1
5 TABLET ORAL EVERY 6 HOURS
Qty: 60 | Refills: 0 | Status: ACTIVE | COMMUNITY
Start: 2024-07-10 | End: 1900-01-01

## 2024-07-11 PROBLEM — S39.012A STRAIN OF LUMBAR REGION, INITIAL ENCOUNTER: Status: ACTIVE | Noted: 2024-07-11

## 2024-08-07 ENCOUNTER — APPOINTMENT (OUTPATIENT)
Dept: ORTHOPEDIC SURGERY | Facility: CLINIC | Age: 64
End: 2024-08-07

## 2024-11-19 ENCOUNTER — APPOINTMENT (OUTPATIENT)
Dept: PAIN MANAGEMENT | Facility: CLINIC | Age: 64
End: 2024-11-19
Payer: COMMERCIAL

## 2024-11-19 VITALS — WEIGHT: 178 LBS | HEIGHT: 62 IN | BODY MASS INDEX: 32.76 KG/M2

## 2024-11-19 DIAGNOSIS — F07.81 POSTCONCUSSIONAL SYNDROME: ICD-10-CM

## 2024-11-19 DIAGNOSIS — I48.91 UNSPECIFIED ATRIAL FIBRILLATION: ICD-10-CM

## 2024-11-19 PROCEDURE — 99203 OFFICE O/P NEW LOW 30 MIN: CPT

## 2024-11-19 RX ORDER — LAMOTRIGINE 150 MG/1
TABLET ORAL
Refills: 0 | Status: ACTIVE | COMMUNITY

## 2024-11-19 RX ORDER — IRBESARTAN AND HYDROCHLOROTHIAZIDE 300; 12.5 MG/1; MG/1
TABLET, FILM COATED ORAL
Refills: 0 | Status: ACTIVE | COMMUNITY

## 2024-11-19 RX ORDER — TIRZEPATIDE 15 MG/.5ML
15 INJECTION, SOLUTION SUBCUTANEOUS
Refills: 0 | Status: ACTIVE | COMMUNITY

## 2024-11-19 RX ORDER — AMLODIPINE BESYLATE 5 MG/1
TABLET ORAL
Refills: 0 | Status: ACTIVE | COMMUNITY

## 2024-11-19 RX ORDER — ALBUTEROL SULFATE 2.5 MG/.5ML
SOLUTION RESPIRATORY (INHALATION)
Refills: 0 | Status: ACTIVE | COMMUNITY

## 2024-11-19 RX ORDER — CARVEDILOL 25 MG/1
TABLET, FILM COATED ORAL
Refills: 0 | Status: ACTIVE | COMMUNITY

## 2024-11-19 RX ORDER — EMPAGLIFLOZIN 25 MG/1
TABLET, FILM COATED ORAL
Refills: 0 | Status: ACTIVE | COMMUNITY

## 2024-11-19 RX ORDER — ATORVASTATIN CALCIUM 80 MG/1
TABLET, FILM COATED ORAL
Refills: 0 | Status: ACTIVE | COMMUNITY

## 2024-11-19 RX ORDER — ALPRAZOLAM 2 MG/1
TABLET ORAL
Refills: 0 | Status: ACTIVE | COMMUNITY

## 2024-11-19 RX ORDER — CYCLOSPORINE 0.5 MG/ML
0.05 EMULSION OPHTHALMIC
Refills: 0 | Status: ACTIVE | COMMUNITY

## 2024-11-19 RX ORDER — ARMODAFINIL 200 MG/1
TABLET ORAL
Refills: 0 | Status: ACTIVE | COMMUNITY

## 2024-11-19 RX ORDER — PROPAFENONE HYDROCHLORIDE 300 MG/1
TABLET, FILM COATED ORAL
Refills: 0 | Status: ACTIVE | COMMUNITY

## 2024-11-19 RX ORDER — BUPROPION HYDROCHLORIDE 100 MG/1
TABLET, FILM COATED ORAL
Refills: 0 | Status: ACTIVE | COMMUNITY

## 2024-11-19 RX ORDER — DULOXETINE HYDROCHLORIDE 30 MG/1
CAPSULE, DELAYED RELEASE ORAL
Refills: 0 | Status: ACTIVE | COMMUNITY

## 2024-11-27 ENCOUNTER — APPOINTMENT (OUTPATIENT)
Dept: PAIN MANAGEMENT | Facility: CLINIC | Age: 64
End: 2024-11-27
Payer: COMMERCIAL

## 2024-11-27 DIAGNOSIS — M48.062 SPINAL STENOSIS, LUMBAR REGION WITH NEUROGENIC CLAUDICATION: ICD-10-CM

## 2024-11-27 PROCEDURE — 82948 REAGENT STRIP/BLOOD GLUCOSE: CPT

## 2024-11-27 PROCEDURE — 62323 NJX INTERLAMINAR LMBR/SAC: CPT

## 2024-12-17 ENCOUNTER — APPOINTMENT (OUTPATIENT)
Dept: PAIN MANAGEMENT | Facility: CLINIC | Age: 64
End: 2024-12-17

## 2025-02-03 ENCOUNTER — OFFICE (OUTPATIENT)
Dept: URBAN - METROPOLITAN AREA CLINIC 88 | Facility: CLINIC | Age: 65
Setting detail: OPHTHALMOLOGY
End: 2025-02-03
Payer: COMMERCIAL

## 2025-02-03 DIAGNOSIS — E11.3291: ICD-10-CM

## 2025-02-03 DIAGNOSIS — E11.3292: ICD-10-CM

## 2025-02-03 DIAGNOSIS — H35.372: ICD-10-CM

## 2025-02-03 DIAGNOSIS — H43.813: ICD-10-CM

## 2025-02-03 DIAGNOSIS — H31.001: ICD-10-CM

## 2025-02-03 PROCEDURE — 92004 COMPRE OPH EXAM NEW PT 1/>: CPT | Performed by: OPHTHALMOLOGY

## 2025-02-03 PROCEDURE — 92235 FLUORESCEIN ANGRPH MLTIFRAME: CPT | Performed by: OPHTHALMOLOGY

## 2025-02-03 PROCEDURE — 92134 CPTRZ OPH DX IMG PST SGM RTA: CPT | Performed by: OPHTHALMOLOGY

## 2025-02-03 ASSESSMENT — TONOMETRY
OD_IOP_MMHG: 17
OS_IOP_MMHG: 15

## 2025-02-03 ASSESSMENT — CONFRONTATIONAL VISUAL FIELD TEST (CVF)
OD_FINDINGS: FULL
OS_FINDINGS: FULL

## 2025-02-12 ASSESSMENT — VISUAL ACUITY
OD_BCVA: 20/30
OS_BCVA: 20/30